# Patient Record
Sex: FEMALE | URBAN - METROPOLITAN AREA
[De-identification: names, ages, dates, MRNs, and addresses within clinical notes are randomized per-mention and may not be internally consistent; named-entity substitution may affect disease eponyms.]

---

## 2023-08-25 ENCOUNTER — APPOINTMENT (RX ONLY)
Dept: URBAN - METROPOLITAN AREA CLINIC 146 | Facility: CLINIC | Age: 73
Setting detail: DERMATOLOGY
End: 2023-08-25

## 2023-08-25 DIAGNOSIS — H01.00 UNSPECIFIED BLEPHARITIS: ICD-10-CM | Status: INADEQUATELY CONTROLLED

## 2023-08-25 DIAGNOSIS — L57.0 ACTINIC KERATOSIS: ICD-10-CM

## 2023-08-25 PROBLEM — H01.001 UNSPECIFIED BLEPHARITIS RIGHT UPPER EYELID: Status: ACTIVE | Noted: 2023-08-25

## 2023-08-25 PROBLEM — H01.004 UNSPECIFIED BLEPHARITIS LEFT UPPER EYELID: Status: ACTIVE | Noted: 2023-08-25

## 2023-08-25 PROCEDURE — 99204 OFFICE O/P NEW MOD 45 MIN: CPT

## 2023-08-25 PROCEDURE — ? PRESCRIPTION

## 2023-08-25 PROCEDURE — ? PRESCRIPTION MEDICATION MANAGEMENT

## 2023-08-25 PROCEDURE — ? COUNSELING

## 2023-08-25 RX ORDER — IMIQUIMOD 12.5 MG/.25G
CREAM TOPICAL QHS
Qty: 24 | Refills: 2 | Status: ERX | COMMUNITY
Start: 2023-08-25

## 2023-08-25 RX ORDER — ERYTHROMYCIN 5 MG/G
OINTMENT OPHTHALMIC QHS
Qty: 3.5 | Refills: 6 | Status: ERX | COMMUNITY
Start: 2023-08-25

## 2023-08-25 RX ORDER — DOXYCYCLINE HYCLATE 20 MG/1
TABLET, FILM COATED ORAL BID
Qty: 60 | Refills: 3 | Status: ERX | COMMUNITY
Start: 2023-08-25

## 2023-08-25 RX ADMIN — DOXYCYCLINE HYCLATE: 20 TABLET, FILM COATED ORAL at 00:00

## 2023-08-25 RX ADMIN — ERYTHROMYCIN: 5 OINTMENT OPHTHALMIC at 00:00

## 2023-08-25 RX ADMIN — IMIQUIMOD: 12.5 CREAM TOPICAL at 00:00

## 2023-08-25 ASSESSMENT — LOCATION DETAILED DESCRIPTION DERM
LOCATION DETAILED: NASAL DORSUM
LOCATION DETAILED: LEFT LATERAL SUPERIOR EYELID
LOCATION DETAILED: RIGHT MEDIAL SUPERIOR EYELID

## 2023-08-25 ASSESSMENT — LOCATION ZONE DERM
LOCATION ZONE: EYELID
LOCATION ZONE: NOSE

## 2023-08-25 ASSESSMENT — LOCATION SIMPLE DESCRIPTION DERM
LOCATION SIMPLE: NOSE
LOCATION SIMPLE: LEFT SUPERIOR EYELID
LOCATION SIMPLE: RIGHT SUPERIOR EYELID

## 2023-08-25 NOTE — PROCEDURE: PRESCRIPTION MEDICATION MANAGEMENT
Render In Strict Bullet Format?: No
Detail Level: Generalized
Initiate Treatment: Erythromycin ointment apply to [facility] Q[facility], Doxycycline 20 mg BID